# Patient Record
Sex: FEMALE | Employment: FULL TIME
[De-identification: names, ages, dates, MRNs, and addresses within clinical notes are randomized per-mention and may not be internally consistent; named-entity substitution may affect disease eponyms.]

---

## 2017-07-08 ENCOUNTER — HEALTH MAINTENANCE LETTER (OUTPATIENT)
Age: 57
End: 2017-07-08

## 2020-08-11 ENCOUNTER — OFFICE VISIT (OUTPATIENT)
Dept: OPTOMETRY | Facility: CLINIC | Age: 60
End: 2020-08-11
Payer: COMMERCIAL

## 2020-08-11 DIAGNOSIS — H00.014 HORDEOLUM EXTERNUM OF LEFT UPPER EYELID: Primary | ICD-10-CM

## 2020-08-11 PROCEDURE — 99203 OFFICE O/P NEW LOW 30 MIN: CPT | Performed by: OPTOMETRIST

## 2020-08-11 RX ORDER — NEOMYCIN SULFATE, POLYMYXIN B SULFATE, AND DEXAMETHASONE 3.5; 10000; 1 MG/G; [USP'U]/G; MG/G
OINTMENT OPHTHALMIC
COMMUNITY
Start: 2020-08-03 | End: 2020-10-13

## 2020-08-11 RX ORDER — FOLIC ACID 1 MG/1
1 TABLET ORAL
COMMUNITY
Start: 2019-10-02

## 2020-08-11 RX ORDER — CEPHALEXIN 500 MG/1
500 CAPSULE ORAL 2 TIMES DAILY
Qty: 10 CAPSULE | Refills: 0 | Status: SHIPPED | OUTPATIENT
Start: 2020-08-11

## 2020-08-11 ASSESSMENT — SLIT LAMP EXAM - LIDS: COMMENTS: 1+ BLEPHARITIS

## 2020-08-11 ASSESSMENT — VISUAL ACUITY
OS_SC: 20/25
OD_SC+: -2
CORRECTION_TYPE: GLASSES
OS_SC+: -1
METHOD: SNELLEN - LINEAR
OD_SC: 20/50

## 2020-08-11 ASSESSMENT — EXTERNAL EXAM - RIGHT EYE: OD_EXAM: NORMAL

## 2020-08-11 ASSESSMENT — EXTERNAL EXAM - LEFT EYE: OS_EXAM: NORMAL

## 2020-08-11 NOTE — LETTER
8/11/2020         RE: Mala Caputo  [address] [facility] [address]        Dear Colleague,    [facility] you [facility] your patient[facility] Please see a copy of my visit note below.    Chief Complaint   Patient presents with     Eye Problem Left Eye     HPI    Eye Problem Left Eye      In left eye. Onset was gradual. This started 10 days ago. Severity is moderate. Occurring constantly. It is worse throughout the day. Associated symptoms include swelling, eye pain, itching, photophobia, and redness. Treatments tried include ointment. Response to treatment was no improvement.   Comments      Ongoing for 10 days, she saw an eye doctor at [facility] and was given pham for the swollen bump.   She reports this is getting bigger and more swollen every day  When she applies neomyxin-poly-dexamet ointment, her eye swells up more. She notes an increase in itching and blurry vision with the pham use.  Has been using hot towels everyday.      Pt would like to schedule a routine eye exam once her eyelid bump is taken care of.      Do you wear contact lenses? No        Samina Boo CPO    See Review Of Systems       Medical, surgical and family histories reviewed and updated 8/11/2020.       She has been using a small piece of gauze under warm water three times daily   But gets cool right away  Using Maxitrol, which seems to be making it worse      OBJECTIVE: See Ophthalmology exam    ASSESSMENT:    ICD-10-CM    1. Hordeolum externum of left upper eyelid  H00.014 cephALEXin (KEFLEX) 500 MG capsule    multiple pointing orifices    PLAN:  Discontinue ointment, proper warm compresses explained, four times daily for 5-10 min  start keflex 500mg two times daily x 10 days   Use ointment if conjunctivitis symptoms begin   Ongoing lid hygiene to prevent future occurrences     Karen Guzman OD     Again, thank you for allowing me to participate in the care of your patient.         Sincerely,        Karen Guzman, OD

## 2020-08-11 NOTE — PATIENT INSTRUCTIONS
Blepharitis is a chronic or long term inflammation of the eyelids and eyelashes. It affects all ages. Causes include poor eyelid hygiene, excess oil production, staph bacteria or an allergic reaction.    Blepharitis may appear as greasy flakes on the base of the eyelashes, crusting of eyelashes and mild redness of the eyelid margins.  Sometimes it may result in an acute infection of a gland in the eyelid called a stye and sometimes painless firm nodules can form in the eyelid that do not resolve on their own and must be surgically removed.    Treatment includes warm compresses and lid hygiene with an antimicrobial lid scrub and sometimes a prescription ointment is needed.      Hot compresses/ warm soaks  Warm compresses are very beneficial to the normal functioning of the eye.   They help loosen up the eyelid debris that has collected on the eyelash follicles.  Overabundance of bacterial microorganisms along the eyelashes and lid margins induce stress on the tear film and promote inflammation.  Regular lid hygiene helps diminish the bacterial population to prevent inflammation and infection.  Cleanse lids once daily with a lid cleansing product as directed such as Ocusoft or Sterilid which can be purchased at most pharmacies. Eyelid cleansers maintain clean and healthy eyelid margins. Ocusoft or sterilid are commercial products that are available as individual wrapped cleansing pads.  Diluted baby shampoo will work, but not as well and is a cheaper alternative.    Directions for warm soaks  There are few methods for hot compresses. Moisten a washcloth with hot water, or microwave for 10-20 seconds, being careful to not get the cloth too hot.   Then put the washcloth onto your eyelids for 5 minutes. It will cool quickly so a rice pack or eyemask that can be heated and laid on top of the washcloth will help retain the heat.    Hot compresses for 10-15 minutes at a time for 4 x day.    Keflex 500 mg  2 x day for 10  days.    If your eye starts to get red you can use a small amount of ointment on lash line

## 2020-08-11 NOTE — PROGRESS NOTES
Chief Complaint   Patient presents with     Eye Problem Left Eye     HPI    Eye Problem Left Eye      In left eye. Onset was gradual. This started 10 days ago. Severity is moderate. Occurring constantly. It is worse throughout the day. Associated symptoms include swelling, eye pain, itching, photophobia, and redness. Treatments tried include ointment. Response to treatment was no improvement.   Comments      Ongoing for 10 days, she saw an eye doctor at [facility] and was given pham for the swollen bump.   She reports this is getting bigger and more swollen every day  When she applies neomyxin-poly-dexamet ointment, her eye swells up more. She notes an increase in itching and blurry vision with the pham use.  Has been using hot towels everyday.      Pt would like to schedule a routine eye exam once her eyelid bump is taken care of.      Do you wear contact lenses? No        Samina Boo CPO    See Review Of Systems       Medical, surgical and family histories reviewed and updated 8/11/2020.       She has been using a small piece of gauze under warm water three times daily   But gets cool right away  Using Maxitrol, which seems to be making it worse      OBJECTIVE: See Ophthalmology exam    ASSESSMENT:    ICD-10-CM    1. Hordeolum externum of left upper eyelid  H00.014 cephALEXin (KEFLEX) 500 MG capsule    multiple pointing orifices    PLAN:  Discontinue ointment, proper warm compresses explained, four times daily for 5-10 min  start keflex 500mg two times daily x 10 days   Use ointment if conjunctivitis symptoms begin   Ongoing lid hygiene to prevent future occurrences     Karen Guzman OD

## 2020-08-19 ENCOUNTER — OFFICE VISIT (OUTPATIENT)
Dept: OPTOMETRY | Facility: CLINIC | Age: 60
End: 2020-08-19
Payer: COMMERCIAL

## 2020-08-19 DIAGNOSIS — H00.014 HORDEOLUM EXTERNUM OF LEFT UPPER EYELID: Primary | ICD-10-CM

## 2020-08-19 PROCEDURE — 99213 OFFICE O/P EST LOW 20 MIN: CPT | Performed by: OPTOMETRIST

## 2020-08-19 ASSESSMENT — VISUAL ACUITY
OD_PH_SC: 20/50
METHOD: SNELLEN - LINEAR
OS_SC+: -1
OS_SC: 20/30
OD_SC: 20/60
OS_PH_SC+: +1
OD_PH_SC+: +2
OD_SC+: -1
OS_PH_SC: 20/30

## 2020-08-19 ASSESSMENT — SLIT LAMP EXAM - LIDS: COMMENTS: TR  BLEPHARITIS

## 2020-08-19 ASSESSMENT — EXTERNAL EXAM - LEFT EYE: OS_EXAM: NORMAL

## 2020-08-19 ASSESSMENT — EXTERNAL EXAM - RIGHT EYE: OD_EXAM: NORMAL

## 2020-08-19 NOTE — LETTER
8/19/2020         RE: Mala Caputo  [address] [facility] [address]        Dear Colleague,    Thank you for referring your patient, Mala Caputo, to the [facility]. Please see a copy of my visit note below.    Chief Complaint   Patient presents with     Follow Up       Do you wear contact lenses? No    Followup hordeolum left eye.   She has finished oral antibiotics, has still been using hot water compresses exactly how instructed, washing right eye with lid scrubs.  Pt is wondering what else she should be doing or if her eye is just going to be like this. She feels like the skin on her lids is red.  She feels she is about 70% improved. She reports itching and dryness yesterday after work. She didn't do water today and has been feeling ok, noticing some itching like it's healing.  Continuing w/ warm compresses   Only using lid hygiene on R side, did not know if she clean the L side       Samina Boo CPO    See Review Of Systems       Medical, surgical and family histories reviewed and updated 8/19/2020.         OBJECTIVE: See Ophthalmology exam    ASSESSMENT:    ICD-10-CM    1. Hordeolum externum of left upper eyelid  H00.014     vastly reduced, and still draining    PLAN:  Continue to [facility] may schedule w/ oph at [place]   If does not resolve, needs lid hygiene both lids     Karen Guzman OD     Again, thank you for allowing me to participate in the care of your patient.        Sincerely,        Karen Guzman, OD

## 2020-08-19 NOTE — PROGRESS NOTES
Chief Complaint   Patient presents with     Follow Up       Do you wear contact lenses? No    Followup hordeolum left eye.   She has finished oral antibiotics, has still been using hot water compresses exactly how instructed, washing right eye with lid scrubs.  Pt is wondering what else she should be doing or if her eye is just going to be like this. She feels like the skin on her lids is red.  She feels she is about 70% improved. She reports itching and dryness yesterday after work. She didn't do water today and has been feeling ok, noticing some itching like it's healing.  Continuing w/ warm compresses   Only using lid hygiene on R side, did not know if she clean the L side       Samina Boo CPO    See Review Of Systems       Medical, surgical and family histories reviewed and updated 8/19/2020.         OBJECTIVE: See Ophthalmology exam    ASSESSMENT:    ICD-10-CM    1. Hordeolum externum of left upper eyelid  H00.014     vastly reduced, and still draining    PLAN:  Continue to [facility] may schedule w/ oph at [place]   If does not resolve, needs lid hygiene both lids     Karen Guzman OD

## 2020-09-14 ENCOUNTER — MYC MEDICAL ADVICE (OUTPATIENT)
Dept: OPTOMETRY | Facility: CLINIC | Age: 60
End: 2020-09-14

## 2020-10-13 ENCOUNTER — OFFICE VISIT (OUTPATIENT)
Dept: OPHTHALMOLOGY | Facility: CLINIC | Age: 60
End: 2020-10-13
Payer: COMMERCIAL

## 2020-10-13 DIAGNOSIS — H02.9 LESION OF LEFT UPPER EYELID: Primary | ICD-10-CM

## 2020-10-13 PROCEDURE — 92002 INTRM OPH EXAM NEW PATIENT: CPT | Mod: 25 | Performed by: OPHTHALMOLOGY

## 2020-10-13 PROCEDURE — 11440 EXC FACE-MM B9+MARG 0.5 CM/<: CPT | Performed by: OPHTHALMOLOGY

## 2020-10-13 PROCEDURE — 88305 TISSUE EXAM BY PATHOLOGIST: CPT | Performed by: PATHOLOGY

## 2020-10-13 RX ORDER — NEOMYCIN SULFATE, POLYMYXIN B SULFATE, AND DEXAMETHASONE 3.5; 10000; 1 MG/G; [USP'U]/G; MG/G
OINTMENT OPHTHALMIC
Qty: 1 TUBE | Refills: 0 | Status: SHIPPED | OUTPATIENT
Start: 2020-10-13

## 2020-10-13 ASSESSMENT — VISUAL ACUITY
OS_SC+: -2
OD_PH_SC: 20/40
METHOD: SNELLEN - LINEAR
OS_SC: 20/40
OD_SC: 20/70
OS_PH_SC+: +2
OD_SC+: -1
OS_PH_SC: 20/25

## 2020-10-13 NOTE — PROGRESS NOTES
Current Eye Medications: Occuscrub qam both eyes     Subjective:  Consult from Dr. Guzman for left upper  bump evaluation. Has been there since July, tried the warm compresses and ointment and it was not helping. It hasn't increased in size, but hasn't gone away. No pain in the eye. 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     Objective:  See Ophthalmology Exam.       Assessment:  Eyelid lesion left upper lid.      Plan:  Lesion left upper lid excised under 2% Lido w Epi and Proparacaine.  Submitted.  Cautery.  Well tolerated.  Cold pack to left upper eyelid tonight.  Bruce/Dex ointment to left upper lid three times daily for about 1 week.  I will call with biopsy results.  Pa White M.D.  [phone]       The following medication was given:     MEDICATION: lidocaine with epi 2%  ROUTE: upper eyelid skin  SITE: left upper eyelid  DOSE: 0.2 ML  LOT #: [id number]  :  [employer]  EXPIRATION DATE:  05/21  NDC#: 843185-669-73

## 2020-10-13 NOTE — LETTER
10/13/2020         RE: Mala Caputo  [address] [facility] [address]        Dear Colleague,    Thank you for referring your patient, Mala Caputo, to the [facility]. Please see a copy of my visit note below.     Current Eye Medications: Occuscrub qam both eyes     Subjective:  Consult from Dr. Guzman for left upper  bump evaluation. Has been there since July, tried the warm compresses and ointment and it was not helping. It hasn't increased in size, but hasn't gone away. No pain in the eye. 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     Objective:  See Ophthalmology Exam.       Assessment:  Eyelid lesion left upper lid.      Plan:  Lesion left upper lid excised under 2% Lido w Epi and Proparacaine.  Submitted.  Cautery.  Well tolerated.  Cold pack to left upper eyelid tonight.  Bruce/Dex ointment to left upper lid three times daily for about 1 week.  I will call with biopsy results.  Pa White M.D.  [phone]       The following medication was given:     MEDICATION: lidocaine with epi 2%  ROUTE: upper eyelid skin  SITE: left upper eyelid  DOSE: 0.2 ML  LOT #: [id number]  :  [employer]  EXPIRATION DATE:  05/21  NDC#: 074489-501-56         Again, thank you for allowing me to participate in the care of your patient.        Sincerely,        Pa White MD

## 2020-10-17 ASSESSMENT — SLIT LAMP EXAM - LIDS: COMMENTS: NORMAL

## 2020-10-17 ASSESSMENT — EXTERNAL EXAM - RIGHT EYE: OD_EXAM: NORMAL

## 2020-10-17 ASSESSMENT — EXTERNAL EXAM - LEFT EYE: OS_EXAM: NORMAL

## 2020-10-18 LAB — COPATH REPORT: NORMAL

## 2020-10-31 ENCOUNTER — TELEPHONE (OUTPATIENT)
Dept: OPHTHALMOLOGY | Facility: CLINIC | Age: 60
End: 2020-10-31

## 2020-11-01 NOTE — TELEPHONE ENCOUNTER
Left message with patient 10/30/20.  Pathology on eyelid lesion benign.  Call if questions or problems with healing.  Pa White M.D.  [phone]

## 2020-11-08 ENCOUNTER — HEALTH MAINTENANCE LETTER (OUTPATIENT)
Age: 60
End: 2020-11-08

## 2021-04-22 ENCOUNTER — TELEPHONE (OUTPATIENT)
Dept: DERMATOLOGY | Facility: CLINIC | Age: 61
End: 2021-04-22

## 2021-05-11 ENCOUNTER — OFFICE VISIT (OUTPATIENT)
Dept: OPTOMETRY | Facility: CLINIC | Age: 61
End: 2021-05-11
Payer: COMMERCIAL

## 2021-05-11 DIAGNOSIS — H11.001 PTERYGIUM EYE, RIGHT: ICD-10-CM

## 2021-05-11 DIAGNOSIS — H52.03 HYPEROPIA OF BOTH EYES WITH ASTIGMATISM: Primary | ICD-10-CM

## 2021-05-11 DIAGNOSIS — H52.203 HYPEROPIA OF BOTH EYES WITH ASTIGMATISM: Primary | ICD-10-CM

## 2021-05-11 DIAGNOSIS — H52.4 PRESBYOPIA: ICD-10-CM

## 2021-05-11 DIAGNOSIS — H26.9 BILATERAL INCIPIENT CATARACTS: ICD-10-CM

## 2021-05-11 PROCEDURE — 92015 DETERMINE REFRACTIVE STATE: CPT | Performed by: OPTOMETRIST

## 2021-05-11 PROCEDURE — 92014 COMPRE OPH EXAM EST PT 1/>: CPT | Performed by: OPTOMETRIST

## 2021-05-11 ASSESSMENT — REFRACTION_MANIFEST
OD_AXIS: 150
OS_CYLINDER: +0.75
METHOD_AUTOREFRACTION: 1
OD_SPHERE: +1.75
OD_AXIS: 180
OD_CYLINDER: +0.25
OS_AXIS: 072
OS_SPHERE: +1.00
OD_ADD: +2.25
OS_SPHERE: +0.75
OD_CYLINDER: +0.25
OS_CYLINDER: +0.75
OS_ADD: +2.25
OD_SPHERE: +2.00
OS_AXIS: 070

## 2021-05-11 ASSESSMENT — TONOMETRY
OS_IOP_MMHG: 13
OD_IOP_MMHG: 13
IOP_METHOD: APPLANATION

## 2021-05-11 ASSESSMENT — CONF VISUAL FIELD
METHOD: COUNTING FINGERS
OS_NORMAL: 1
OD_NORMAL: 1

## 2021-05-11 ASSESSMENT — CUP TO DISC RATIO
OS_RATIO: 0.5
OD_RATIO: 0.4

## 2021-05-11 ASSESSMENT — VISUAL ACUITY
OS_SC+: +1
OS_SC: 20/40
METHOD: SNELLEN - LINEAR
OS_SC: 20/60
OD_SC: 20/120
OD_SC: 20/70

## 2021-05-11 ASSESSMENT — SLIT LAMP EXAM - LIDS: COMMENTS: NORMAL

## 2021-05-11 ASSESSMENT — EXTERNAL EXAM - LEFT EYE: OS_EXAM: NORMAL

## 2021-05-11 ASSESSMENT — EXTERNAL EXAM - RIGHT EYE: OD_EXAM: NORMAL

## 2021-05-11 NOTE — LETTER
5/11/2021         RE: Mala Caputo  [address] [facility] [address]        Dear Colleague,    Thank you for referring your patient, Mala Caputo, to the [facility]. Please see a copy of my visit note below.    Chief Complaint   Patient presents with     Annual Eye Exam      Blur at near  Getting by with OTC readers  Has had SRX in the past - but she doesn't currently wear them. She had a bifocal and was unable to adapt to them.    Last Eye Exam: 2019  Dilated Previously: Yes. Signs and symptoms of dilation were discussed. Patient consents to dilation today.    What are you currently using to see?  readers       Distance Vision Acuity: Satisfied with vision    Near Vision Acuity: Not satisfied     Eye Comfort: Tired  Do you use eye drops? : No      Samina Boo CPO            Medical, surgical and family histories reviewed and updated 5/11/2021.       OBJECTIVE: See Ophthalmology exam    ASSESSMENT:    ICD-10-CM    1. Hyperopia of both eyes with astigmatism  H52.03 REFRACTION    H52.203 EYE EXAM (SIMPLE-NONBILLABLE)   2. Presbyopia  H52.4    3. Pterygium eye, right  H11.001    4. Bilateral incipient cataracts  H26.9       PLAN:   Update prescription/ discussed options    Karen Guzman OD       Again, thank you for allowing me to participate in the care of your patient.        Sincerely,        Karen Guzman, OD

## 2021-05-11 NOTE — PROGRESS NOTES
Chief Complaint   Patient presents with     Annual Eye Exam      Blur at near  Getting by with OTC readers  Has had SRX in the past - but she doesn't currently wear them. She had a bifocal and was unable to adapt to them.    Last Eye Exam: 2019  Dilated Previously: Yes. Signs and symptoms of dilation were discussed. Patient consents to dilation today.    What are you currently using to see?  readers       Distance Vision Acuity: Satisfied with vision    Near Vision Acuity: Not satisfied     Eye Comfort: Tired  Do you use eye drops? : No      Samina Boo CPO            Medical, surgical and family histories reviewed and updated 5/11/2021.       OBJECTIVE: See Ophthalmology exam    ASSESSMENT:    ICD-10-CM    1. Hyperopia of both eyes with astigmatism  H52.03 REFRACTION    H52.203 EYE EXAM (SIMPLE-NONBILLABLE)   2. Presbyopia  H52.4    3. Pterygium eye, right  H11.001    4. Bilateral incipient cataracts  H26.9       PLAN:   Update prescription/ discussed options    Karen Guzman OD

## 2021-05-18 ENCOUNTER — DOCUMENTATION ONLY (OUTPATIENT)
Dept: CARE COORDINATION | Facility: CLINIC | Age: 61
End: 2021-05-18

## 2021-06-08 ENCOUNTER — OFFICE VISIT (OUTPATIENT)
Dept: DERMATOLOGY | Facility: CLINIC | Age: 61
End: 2021-06-08
Payer: COMMERCIAL

## 2021-06-08 DIAGNOSIS — B07.9 VERRUCA VULGARIS: Primary | ICD-10-CM

## 2021-06-08 PROCEDURE — 99202 OFFICE O/P NEW SF 15 MIN: CPT | Mod: 25 | Performed by: DERMATOLOGY

## 2021-06-08 PROCEDURE — 11900 INJECT SKIN LESIONS </W 7: CPT | Mod: 59 | Performed by: DERMATOLOGY

## 2021-06-08 PROCEDURE — 17110 DESTRUCTION B9 LES UP TO 14: CPT | Performed by: DERMATOLOGY

## 2021-06-08 RX ORDER — CANDIDA ALBICANS 1000 [PNU]/ML
0.3 INJECTION, SOLUTION INTRADERMAL ONCE
Status: ACTIVE | OUTPATIENT
Start: 2021-06-08

## 2021-06-08 ASSESSMENT — PAIN SCALES - GENERAL: PAINLEVEL: NO PAIN (0)

## 2021-06-08 NOTE — LETTER
6/8/2021       RE: Mala Caputo  [address] [facility] [address]     Dear Colleague,    Thank you for referring your patient, Mala Caputo, to the [facility] [place] [facility]. Please see a copy of my visit note below.    [facility] Dermatology Note  Encounter Date: Jun 8, 2021  Office Visit     Dermatology Problem List:  1. Verruca vulgaris.   - pare + LiqN2/candida antigen    ____________________________________________    Assessment & Plan:     #  Verruca vulgaris, bilateral fingers x 5.   - Cryotherapy and Candida injection performed today (see procedure note(s) below).   - 1 lesion was pared with a 15-blade prior to cryotherapy.   - Follow-up in 6 weeks    Procedures Performed:   - Cryotherapy procedure note, location(s): bilateral hands. After verbal consent and discussion of risks and benefits including, but not limited to, dyspigmentation/scar, blister, and pain, 5 lesion(s) was(were) treated with 1-2 mm freeze border for 1-2 cycles with liquid nitrogen. Post cryotherapy instructions were provided.  - Intra-lesional candida injection procedure note. Discussion had with patient regarding candida antigen injection as potential treatment option and verbal consent obtained. After positioning and cleansing with isopropyl alcohol, 0.3 total mL of candida albicans antigen was injected into 1 lesion(s) on the left dorsal hand. The patient tolerated the procedure well and left the dermatology clinic in good condition.    Follow-up: 6 week(s) in-person, or earlier for new or changing lesions    Staff:     Jhonny Choudhury MD  Pronouns: he/him/his    Department of Dermatology  [facility]: Phone: [phone], Fax:[phone]  [facility]: Phone: [phone] Fax:  [phone]    ____________________________________________    CC: Derm Problem (Mala is here to have several warts on her hands examined. She states that she first started noticing warts on her hands about a year ago. She states that has tried to use over the counter product. )    HPI:  Ms. Mala Caputo is a(n) 60 year old female who presents today as a new patient for evaluation of warts on the hands. She reports a 2+ year history of warts on the hands. This first started as a small wart on her R1 finger near the nail, but has been spreading over the last few months. She has tried some OTC therapies (unsure which) but they have not been helpful. No family members with similar symptoms.     Patient is otherwise feeling well, without additional skin concerns.     Labs Reviewed:  N/A    Physical Exam:  Vitals: There were no vitals taken for this visit.  SKIN: Full skin, which includes the head/face, both arms, chest, back, abdomen,both legs, genitalia and/or groin buttocks, digits and/or nails, was examined.  - Verrucous papules interrupting dermatoglyphs on the left dorsal fingers x 2, right 1st and 3rd digits near nail folds x 3.   - No other lesions of concern on areas examined.     Medications:  Current Outpatient Medications   Medication     cephALEXin (KEFLEX) 500 MG capsule     folic acid (FOLVITE) 1 MG tablet     lidocaine (XYLOCAINE) 2 % solution     neomycin-polymyxin-dexamethasone (MAXITROL) 3.5-74777-9.1 ophthalmic ointment     ORDER FOR DME     VIT CALCIUM CITRATE + D TABS 250-62.5 MG-IU OR     VITAMIN D 51750 IU OR CAPS     No current facility-administered medications for this visit.      Facility-Administered Medications Ordered in Other Visits   Medication     gelatin adsorbable (GELFOAM) 12-7 MM sponge      Past Medical History:   Patient Active Problem List   Diagnosis     Pain of right heel     Left foot pain     Bilateral incipient cataracts     Pterygium eye, right     Past Medical  History:   Diagnosis Date     Arthritis      Bilateral incipient cataracts 5/11/2021     Stomach ache        [facility]  [address] [place] [address] on close of this encounter.

## 2021-06-08 NOTE — NURSING NOTE
Drug Administration Record    Prior to injection, verified patient identity using patient's name and date of birth.  Due to injection administration, patient instructed to remain in clinic for 15 minutes  afterwards, and to report any adverse reaction to me immediately.    Drug Name: triamcinolone acetonide(kenalog)  Dose: 0.3mL of candida antigen  Route administered: ID  NDC #: Candida (69029-156-08)  Amount of waste(mL):0.7mL  Reason for waste: Multi dose vial    LOT #:   SITE: see provider note  : [employer]  EXPIRATION DATE: 4/16/2022

## 2021-06-08 NOTE — NURSING NOTE
Dermatology Rooming Note    Mala Harshal's goals for this visit include:   Chief Complaint   Patient presents with     Derm Problem     Mala is here to have several warts on her hands examined. She states that she first started noticing warts on her hands about a year ago. She states that has tried to use over the counter product.      Dony Kitchen, EMT

## 2021-06-08 NOTE — PROGRESS NOTES
[facility] Dermatology Note  Encounter Date: Jun 8, 2021  Office Visit     Dermatology Problem List:  1. Verruca vulgaris.   - pare + LiqN2/candida antigen    ____________________________________________    Assessment & Plan:     #  Verruca vulgaris, bilateral fingers x 5.   - Cryotherapy and Candida injection performed today (see procedure note(s) below).   - 1 lesion was pared with a 15-blade prior to cryotherapy.   - Follow-up in 6 weeks    Procedures Performed:   - Cryotherapy procedure note, location(s): bilateral hands. After verbal consent and discussion of risks and benefits including, but not limited to, dyspigmentation/scar, blister, and pain, 5 lesion(s) was(were) treated with 1-2 mm freeze border for 1-2 cycles with liquid nitrogen. Post cryotherapy instructions were provided.  - Intra-lesional candida injection procedure note. Discussion had with patient regarding candida antigen injection as potential treatment option and verbal consent obtained. After positioning and cleansing with isopropyl alcohol, 0.3 total mL of candida albicans antigen was injected into 1 lesion(s) on the left dorsal hand. The patient tolerated the procedure well and left the dermatology clinic in good condition.    Follow-up: 6 week(s) in-person, or earlier for new or changing lesions    Staff:     Jhonny Choudhury MD  Pronouns: he/him/his    Department of Dermatology  [facility]: Phone: [phone], Fax:[phone]  [facility]: Phone: [phone] Fax: [phone]    ____________________________________________    CC: Derm Problem (Mala is here to have several warts on her hands examined. She states that she first started noticing warts on her hands about a year ago. She states that has tried to use over the counter product. )    HPI:  Ms. Mala Caputo is a(n) 60 year old  female who presents today as a new patient for evaluation of warts on the hands. She reports a 2+ year history of warts on the hands. This first started as a small wart on her R1 finger near the nail, but has been spreading over the last few months. She has tried some OTC therapies (unsure which) but they have not been helpful. No family members with similar symptoms.     Patient is otherwise feeling well, without additional skin concerns.     Labs Reviewed:  N/A    Physical Exam:  Vitals: There were no vitals taken for this visit.  SKIN: Full skin, which includes the head/face, both arms, chest, back, abdomen,both legs, genitalia and/or groin buttocks, digits and/or nails, was examined.  - Verrucous papules interrupting dermatoglyphs on the left dorsal fingers x 2, right 1st and 3rd digits near nail folds x 3.   - No other lesions of concern on areas examined.     Medications:  Current Outpatient Medications   Medication     cephALEXin (KEFLEX) 500 MG capsule     folic acid (FOLVITE) 1 MG tablet     lidocaine (XYLOCAINE) 2 % solution     neomycin-polymyxin-dexamethasone (MAXITROL) 3.5-81654-9.1 ophthalmic ointment     ORDER FOR DME     VIT CALCIUM CITRATE + D TABS 250-62.5 MG-IU OR     VITAMIN D 90390 IU OR CAPS     No current facility-administered medications for this visit.      Facility-Administered Medications Ordered in Other Visits   Medication     gelatin adsorbable (GELFOAM) 12-7 MM sponge      Past Medical History:   Patient Active Problem List   Diagnosis     Pain of right heel     Left foot pain     Bilateral incipient cataracts     Pterygium eye, right     Past Medical History:   Diagnosis Date     Arthritis      Bilateral incipient cataracts 5/11/2021     Stomach ache        [facility]  [address] [place] [address] on close of this encounter.

## 2021-07-07 ENCOUNTER — OFFICE VISIT (OUTPATIENT)
Dept: DERMATOLOGY | Facility: CLINIC | Age: 61
End: 2021-07-07
Payer: COMMERCIAL

## 2021-07-07 DIAGNOSIS — B07.9 VIRAL WARTS, UNSPECIFIED TYPE: Primary | ICD-10-CM

## 2021-07-07 PROCEDURE — 17110 DESTRUCTION B9 LES UP TO 14: CPT | Mod: GC | Performed by: DERMATOLOGY

## 2021-07-07 RX ORDER — FLUOROURACIL 50 MG/G
CREAM TOPICAL 2 TIMES DAILY
Qty: 40 G | Refills: 0 | Status: SHIPPED | OUTPATIENT
Start: 2021-07-07 | End: 2021-07-07

## 2021-07-07 RX ORDER — FLUOROURACIL 50 MG/G
CREAM TOPICAL
Qty: 40 G | Refills: 0 | Status: SHIPPED | OUTPATIENT
Start: 2021-07-07

## 2021-07-07 ASSESSMENT — PAIN SCALES - GENERAL: PAINLEVEL: NO PAIN (0)

## 2021-07-07 NOTE — LETTER
7/7/2021       RE: Mala Caputo  [address] [facility] [address]     Dear Colleague,    Thank you for referring your patient, Mala Caputo, to the [facility] DERMATOLOGY [facility] [place] [facility]. Please see a copy of my visit note below.    [facility] Dermatology Note  Encounter Date: Jul 7, 2021  Office Visit     Dermatology Problem List:  1. Verruca vulgaris.   - LN2, efudex, OTC sal acid 7/7/21  - s/p pare + LiqN2/candida antigen 6/8/21  __________________________________________    Assessment & Plan:     # Verruca vulgaris.   - s/p pare + LiqN2/candida antigen 6/8/21  - repeat LN2 treatment today  - Efudex rx'd, to use in combination with Mark Acid QHS      Procedures Performed:   - Cryotherapy procedure note, location(s): hands. After verbal consent and discussion of risks and benefits including, but not limited to, dyspigmentation/scar, blister, and pain, 5 lesion(s) was(were) treated with 1-2 mm freeze border for 3 cycles with liquid nitrogen. Post cryotherapy instructions were provided.    Follow-up: 6 week(s) in-person, or earlier for new or changing lesions    Staff and Resident:     Ted Romeo MD     Staff Physician Comments:   I saw and evaluated the patient with the resident and I agree with the assessment and plan.  I was present for the entire minor procedure and examination.    Jhonny Choudhury MD  Pronouns: he/him/his    Department of Dermatology  [facility]: Phone: [phone], Fax:[phone]  [facility]: Phone: [phone] Fax: [phone]    ____________________________________________    CC: Derm Problem (Mala is here today for a follow up for hand warts. She states that the warts seem to have improved since the last visit. She reports no new warts at this time.  )    HPI:  Ms. Mala Caputo is a(n) 60 year old female who presents today as a return patient for wart follow up    After injection of CA lesion on hand became more red, swollen for approximately 4 weeks  Areas that were treated with LN became smaller  Not doing any OTC treatments  Base of right thumb another new lesion noticed  Other lesions on right thumb, right index finger, middle finger left hand, index knuckle   Patient is otherwise feeling well, without additional skin concerns.    Labs Reviewed:  N/A    Physical Exam:  Vitals: There were no vitals taken for this visit.  SKIN: Focused examination of bilateral hands was performed.  - Hyperkeratotic, exophytic papules and plaques located on tip and PCP of right thumb, tip of index finger of right hand, tip of middle finger left hand  - Erythematous, light pink papule located over MCP joint of index finger left hand  - Nails painted   - No other lesions of concern on areas examined.     Medications:  Current Outpatient Medications   Medication     cephALEXin (KEFLEX) 500 MG capsule     folic acid (FOLVITE) 1 MG tablet     lidocaine (XYLOCAINE) 2 % solution     neomycin-polymyxin-dexamethasone (MAXITROL) 3.5-53069-1.1 ophthalmic ointment     ORDER FOR DME     VIT CALCIUM CITRATE + D TABS 250-62.5 MG-IU OR     VITAMIN D 42273 IU OR CAPS     Current Facility-Administered Medications   Medication     candida albicans skin test injection 0.3 mL     Facility-Administered Medications Ordered in Other Visits   Medication     gelatin adsorbable (GELFOAM) 12-7 MM sponge      Past Medical History:   Patient Active Problem List   Diagnosis     Pain of right heel     Left foot pain     Bilateral incipient cataracts     Pterygium eye, right     Past Medical History:   Diagnosis Date     Arthritis      Bilateral incipient cataracts 5/11/2021     Stomach ache        [facility]  [address] [place] [address] on close of this  encounter.  -

## 2021-07-07 NOTE — NURSING NOTE
Dermatology Rooming Note    Mala Caputo's goals for this visit include:   Chief Complaint   Patient presents with     Derm Problem     Mala is here today for a follow up for hand warts. She states that the warts seem to have improved since the last visit. She reports no new warts at this time.      Dony Kitchen, EMT

## 2021-07-07 NOTE — PROGRESS NOTES
[facility] Dermatology Note  Encounter Date: Jul 7, 2021  Office Visit     Dermatology Problem List:  1. Verruca vulgaris.   - LN2, efudex, OTC sal acid 7/7/21  - s/p pare + LiqN2/candida antigen 6/8/21  __________________________________________    Assessment & Plan:     # Verruca vulgaris.   - s/p pare + LiqN2/candida antigen 6/8/21  - repeat LN2 treatment today  - Efudex rx'd, to use in combination with Mark Acid QHS      Procedures Performed:   - Cryotherapy procedure note, location(s): hands. After verbal consent and discussion of risks and benefits including, but not limited to, dyspigmentation/scar, blister, and pain, 5 lesion(s) was(were) treated with 1-2 mm freeze border for 3 cycles with liquid nitrogen. Post cryotherapy instructions were provided.    Follow-up: 6 week(s) in-person, or earlier for new or changing lesions    Staff and Resident:     Ted Romeo MD     Staff Physician Comments:   I saw and evaluated the patient with the resident and I agree with the assessment and plan.  I was present for the entire minor procedure and examination.    Jhonny Choudhury MD  Pronouns: he/him/his    Department of Dermatology  [facility]: Phone: [phone], Fax:[phone]  [facility]: Phone: [phone] Fax: [phone]    ____________________________________________    CC: Derm Problem (Mala is here today for a follow up for hand warts. She states that the warts seem to have improved since the last visit. She reports no new warts at this time. )    HPI:  Ms. Mala Caputo is a(n) 60 year old female who presents today as a return patient for wart follow up    After injection of CA lesion on hand became more red, swollen for approximately 4 weeks  Areas that were treated with LN became smaller  Not doing any OTC treatments  Base of right thumb another new  lesion noticed  Other lesions on right thumb, right index finger, middle finger left hand, index knuckle   Patient is otherwise feeling well, without additional skin concerns.    Labs Reviewed:  N/A    Physical Exam:  Vitals: There were no vitals taken for this visit.  SKIN: Focused examination of bilateral hands was performed.  - Hyperkeratotic, exophytic papules and plaques located on tip and PCP of right thumb, tip of index finger of right hand, tip of middle finger left hand  - Erythematous, light pink papule located over MCP joint of index finger left hand  - Nails painted   - No other lesions of concern on areas examined.     Medications:  Current Outpatient Medications   Medication     cephALEXin (KEFLEX) 500 MG capsule     folic acid (FOLVITE) 1 MG tablet     lidocaine (XYLOCAINE) 2 % solution     neomycin-polymyxin-dexamethasone (MAXITROL) 3.5-54912-3.1 ophthalmic ointment     ORDER FOR DME     VIT CALCIUM CITRATE + D TABS 250-62.5 MG-IU OR     VITAMIN D 98040 IU OR CAPS     Current Facility-Administered Medications   Medication     candida albicans skin test injection 0.3 mL     Facility-Administered Medications Ordered in Other Visits   Medication     gelatin adsorbable (GELFOAM) 12-7 MM sponge      Past Medical History:   Patient Active Problem List   Diagnosis     Pain of right heel     Left foot pain     Bilateral incipient cataracts     Pterygium eye, right     Past Medical History:   Diagnosis Date     Arthritis      Bilateral incipient cataracts 5/11/2021     Stomach ache        [facility]  [address] [place] [address] on close of this encounter.

## 2021-07-07 NOTE — PATIENT INSTRUCTIONS
- Apply salicylic acid to your warts at night.   - Then apply a small amount the Efudex cream (avoid touching other areas of your body and keep this medication out of reach of children and animals)  - Wash your hands the following morning    Topical Salicylic Acid Treatment    What is this medicine used for?    Treatment of warts at home    Brand name and generic salicylic acid products are available over the counter    The products are available as liquids or sticky patches    Some brand names are: Duofilm , Mediplast , Dr. Coco esteban , and Occlusol      When can I start using topical salicylic acid?    If you had treatment of your warts in clinic today wait about 1 week. The irritation or soreness will be gone.    Use the salicylic acid today if your warts were not treated in clinic.    How do I use topical salicylic acid?    Soak warts in warm water for 5 to 10 minutes. You may use your daily shower to do this.    Pat the area dry with a towel.    You need to remove the dead skin over the warts. Gently use a pumice stone or emery board to remove the dead skin. Do not do this to the point of discomfort or bleeding    Note: Do not use the emery board or pumice stone for anything else. There is a chance of spreading the virus that causes warts.     Put 1 drop of the liquid or place a patch on each wart. Try not to get the medicine on the surrounding skin. Cover the treated areas with strong tape, or you can use Dr. Coco esteban  or other brand moleskin    Soak, remove dead skin, and apply the salicylic acid each day    If you have skin irritation, skip days in between treatments until the irritation resolves.  Then start the treatment again.

## 2021-09-11 ENCOUNTER — HEALTH MAINTENANCE LETTER (OUTPATIENT)
Age: 61
End: 2021-09-11

## 2022-01-01 ENCOUNTER — HEALTH MAINTENANCE LETTER (OUTPATIENT)
Age: 62
End: 2022-01-01

## 2022-10-30 ENCOUNTER — HEALTH MAINTENANCE LETTER (OUTPATIENT)
Age: 62
End: 2022-10-30

## 2022-12-17 ENCOUNTER — HEALTH MAINTENANCE LETTER (OUTPATIENT)
Age: 62
End: 2022-12-17

## 2023-04-08 ENCOUNTER — HEALTH MAINTENANCE LETTER (OUTPATIENT)
Age: 63
End: 2023-04-08

## 2023-06-27 NOTE — PATIENT INSTRUCTIONS
Post-Anesthesia Patient Instructions    IMMEDIATELY FOLLOWING SURGERY:  Do not drive or operate machinery for the first twenty four hours after surgery.  Do not make any important decisions for twenty four hours after surgery or while taking narcotic pain medications or sedatives.  If you develop intractable nausea and vomiting or a severe headache please notify your doctor immediately.    FOLLOW-UP:  Please make an appointment with your surgeon as instructed. You do not need to follow up with anesthesia unless specifically instructed to do so.    WOUND CARE INSTRUCTIONS (if applicable):  Keep a dry clean dressing on the anesthesia/puncture wound site if there is drainage.  Once the wound has quit draining you may leave it open to air.  Generally you should leave the bandage intact for twenty four hours unless there is drainage.  If the epidural site drains for more than 36-48 hours please call the anesthesia department.    QUESTIONS?:  Please feel free to call your physician or the hospital  if you have any questions, and they will be happy to assist you.      Cryotherapy    What is it?    Use of a very cold liquid, such as liquid nitrogen, to freeze and destroy abnormal skin cells that need to be removed    What should I expect?    Tenderness and redness    A small blister that might grow and fill with dark purple blood. There may be crusting.    More than one treatment may be needed if the lesions do not go away.    How do I care for the treated area?    Gently wash the area with your hands when bathing.    Use a thin layer of Vaseline to help with healing. You may use a Band-Aid.     The area should heal within 7-10 days and may leave behind a pink or lighter color.     Do not use an antibiotic or Neosporin ointment.     You may take acetaminophen (Tylenol) for pain.     Call your Doctor if you have:    Severe pain    Signs of infection (warmth, redness, cloudy yellow drainage, and or a bad smell)    Questions or concerns    Who should I call with questions?       [facility]: [phone]       [facility]: [phone]       For urgent needs outside of business hours call the [facility] at [phone]        and ask for the dermatology resident on call

## 2024-06-09 ENCOUNTER — HEALTH MAINTENANCE LETTER (OUTPATIENT)
Age: 64
End: 2024-06-09

## (undated) RX ORDER — CANDIDA ALBICANS 1000 [PNU]/ML
INJECTION, SOLUTION INTRADERMAL
Status: DISPENSED
Start: 2021-06-08